# Patient Record
Sex: MALE | Race: WHITE
[De-identification: names, ages, dates, MRNs, and addresses within clinical notes are randomized per-mention and may not be internally consistent; named-entity substitution may affect disease eponyms.]

---

## 2017-12-15 ENCOUNTER — HOSPITAL ENCOUNTER (OUTPATIENT)
Dept: HOSPITAL 53 - M LAB | Age: 27
End: 2017-12-15
Attending: UROLOGY
Payer: COMMERCIAL

## 2017-12-15 DIAGNOSIS — Z01.818: Primary | ICD-10-CM

## 2017-12-15 DIAGNOSIS — N50.9: ICD-10-CM

## 2017-12-15 LAB
ANION GAP SERPL CALC-SCNC: 6 MEQ/L (ref 8–16)
BUN SERPL-MCNC: 16 MG/DL (ref 7–18)
CALCIUM SERPL-MCNC: 9.3 MG/DL (ref 8.5–10.1)
CHLORIDE SERPL-SCNC: 103 MEQ/L (ref 98–107)
CO2 SERPL-SCNC: 30 MEQ/L (ref 21–32)
CREAT SERPL-MCNC: 0.78 MG/DL (ref 0.7–1.3)
ERYTHROCYTE [DISTWIDTH] IN BLOOD BY AUTOMATED COUNT: 13 % (ref 11.5–14.5)
GFR SERPL CREATININE-BSD FRML MDRD: > 60 ML/MIN/{1.73_M2} (ref 60–?)
GLUCOSE SERPL-MCNC: 91 MG/DL (ref 70–105)
INR PPP: 0.91
MCH RBC QN AUTO: 30.3 PG (ref 27–33)
MCHC RBC AUTO-ENTMCNC: 32.8 G/DL (ref 32–36.5)
MCV RBC AUTO: 92.5 FL (ref 80–96)
NRBC BLD AUTO-RTO: 0 % (ref 0–0)
PLATELET # BLD AUTO: 286 10^3/UL (ref 150–450)
POTASSIUM SERPL-SCNC: 4.4 MEQ/L (ref 3.5–5.1)
SODIUM SERPL-SCNC: 139 MEQ/L (ref 136–145)
WBC # BLD AUTO: 10.6 10^3/UL (ref 4–10)

## 2017-12-18 ENCOUNTER — HOSPITAL ENCOUNTER (OUTPATIENT)
Dept: HOSPITAL 53 - M SDC | Age: 27
Discharge: HOME | End: 2017-12-18
Attending: UROLOGY
Payer: COMMERCIAL

## 2017-12-18 VITALS — WEIGHT: 315 LBS | HEIGHT: 70 IN | BODY MASS INDEX: 45.1 KG/M2

## 2017-12-18 VITALS — SYSTOLIC BLOOD PRESSURE: 132 MMHG | DIASTOLIC BLOOD PRESSURE: 76 MMHG

## 2017-12-18 DIAGNOSIS — C62.92: Primary | ICD-10-CM

## 2017-12-18 DIAGNOSIS — F31.9: ICD-10-CM

## 2017-12-18 DIAGNOSIS — Z79.899: ICD-10-CM

## 2017-12-18 DIAGNOSIS — Z88.8: ICD-10-CM

## 2017-12-18 DIAGNOSIS — F17.210: ICD-10-CM

## 2017-12-18 DIAGNOSIS — F41.9: ICD-10-CM

## 2017-12-18 RX ADMIN — HYDROMORPHONE HYDROCHLORIDE PRN MG: 1 INJECTION, SOLUTION INTRAMUSCULAR; INTRAVENOUS; SUBCUTANEOUS at 18:16

## 2017-12-18 RX ADMIN — HYDROMORPHONE HYDROCHLORIDE PRN MG: 1 INJECTION, SOLUTION INTRAMUSCULAR; INTRAVENOUS; SUBCUTANEOUS at 18:06

## 2017-12-18 RX ADMIN — HYDROMORPHONE HYDROCHLORIDE PRN MG: 1 INJECTION, SOLUTION INTRAMUSCULAR; INTRAVENOUS; SUBCUTANEOUS at 18:11

## 2017-12-18 RX ADMIN — HYDROMORPHONE HYDROCHLORIDE PRN MG: 1 INJECTION, SOLUTION INTRAMUSCULAR; INTRAVENOUS; SUBCUTANEOUS at 18:21

## 2017-12-18 RX ADMIN — HYDROMORPHONE HYDROCHLORIDE PRN MG: 1 INJECTION, SOLUTION INTRAMUSCULAR; INTRAVENOUS; SUBCUTANEOUS at 18:26

## 2017-12-18 RX ADMIN — FENTANYL CITRATE PRN MCG: 50 INJECTION, SOLUTION INTRAMUSCULAR; INTRAVENOUS at 17:45

## 2017-12-18 RX ADMIN — FENTANYL CITRATE PRN MCG: 50 INJECTION, SOLUTION INTRAMUSCULAR; INTRAVENOUS at 17:30

## 2017-12-18 RX ADMIN — FENTANYL CITRATE PRN MCG: 50 INJECTION, SOLUTION INTRAMUSCULAR; INTRAVENOUS at 17:40

## 2017-12-18 RX ADMIN — FENTANYL CITRATE PRN MCG: 50 INJECTION, SOLUTION INTRAMUSCULAR; INTRAVENOUS at 17:35

## 2017-12-18 NOTE — ROOPDOC
[facility] Report Of Operation


Report of Operation


DATE OF PROCEDURE:  12/18/17


 


PREPROCEDURE DIAGNOSIS:


Left testicular mass.


 


POSTPROCEDURE DIAGNOSIS:


Left testicular mass.


 


PROCEDURE:  Left radical orchiectomy.


 


SURGEON:  Dr. Daniel Dunn


 


ASSISTANT:  None.


 


ANESTHESIA:  General.


 


OPERATIVE INDICATIONS: This is a 27-year-old male who was recently found to 

have an enlarged left testicle with multiple masses. Given the likelihood that 

he has testicular cancer, it was recommended that he be brought to the 

operating room today for the above-listed procedure.


 


DESCRIPTION OF PROCEDURE:  The patient was brought to the operating room, and 

general anesthesia was induced. Prophylactic antibiotics were infused. He was 

then placed in supine position and prepped and draped in the usual sterile 

fashion. At this point, a 7 cm incision was made overlying the left external 

ring. We then dissected down through the subcutaneous tissues using Bovie 

electrocautery. Once we got down to the external oblique aponeurosis, this was 

opened using Metzenbaum scissors. We made sure not to damage the ilioinguinal 

nerve. At this point, a right-angle clamp was passed posterior to the spermatic 

cord, and a Penrose drain was wrapped around it twice. This was then clamped. 

We then delivered the testicle out of the scrotum and through our inguinal 

incision. Then gubernacular fibers were release using electrocautery. We 

continued to release fibers until the testicle was free from the scrotum. The 

spermatic cord was then traced up to the area where the Penrose drain had been 

placed. We tried to make sure we had good hemostasis while doing this. The 

spermatic cord was then freed up to the level of the internal ring. At this 

point, a right-angle clamp was placed across the spermatic cord. The cord was 

then suture ligated with an #0 silk suture. This was cut long. The cord was 

then divided into two separate packets distal to our #0 silk suture ligature. 

These packets were then separately ligated with #0 silk sutures. The cord was 

then transected distal to the #0 silk sutures. The left testicle and spermatic 

cord were then handed off the table to sent for pathologic analysis. At this 

point, the wound was irrigated. We closed the external oblique fascia using a 

running #2-0 Vicryl suture. Next, subcutaneous tissues were reapproximated with 

interrupted #2-0 chromic sutures. Skin was then closed with subcuticular #4-0 

Monocryl suture. Once the skin was closed, local anesthesia was administered. 

Dermabond was then applied to the incision, and this marked the conclusion of 

the procedure. The patient was then awakened from anesthesia and transported to 

the recovery room in stable condition.


 


ESTIMATED BLOOD LOSS: 5 mL.


 


COMPLICATIONS: None.


 


SPECIMENS: Left testicle and spermatic cord.


 


PLAN: The patient will be brought back to be seen in clinic in one to two weeks 

to go over pathology results and to discuss future treatment.











DANIEL DUNN MD Dec 18, 2017 17:39

## 2017-12-19 LAB — HCG INTACT+B SERPL-ACNC: 23 MIU/ML (ref 0–3)

## 2018-01-12 ENCOUNTER — HOSPITAL ENCOUNTER (OUTPATIENT)
Dept: HOSPITAL 53 - M LAB | Age: 28
End: 2018-01-12
Attending: UROLOGY
Payer: COMMERCIAL

## 2018-01-12 DIAGNOSIS — C62.90: Primary | ICD-10-CM

## 2018-01-12 LAB
ALPHA FETOPROTEIN TUMOR QUANT: 1.4 NG/ML (ref ?–8.1)
LDH SERPL L TO P-CCNC: 200 U/L (ref 87–241)

## 2018-01-14 LAB — HCG INTACT+B SERPL-ACNC: < 1 MIU/ML (ref 0–3)

## 2018-02-15 ENCOUNTER — HOSPITAL ENCOUNTER (OUTPATIENT)
Dept: HOSPITAL 53 - M ONCR | Age: 28
End: 2018-02-15
Attending: RADIOLOGY
Payer: COMMERCIAL

## 2018-02-15 DIAGNOSIS — C62.92: Primary | ICD-10-CM

## 2018-02-15 PROCEDURE — 99201: CPT

## 2021-05-01 ENCOUNTER — HOSPITAL ENCOUNTER (EMERGENCY)
Dept: HOSPITAL 53 - M ED | Age: 31
Discharge: HOME | End: 2021-05-01
Payer: SELF-PAY

## 2021-05-01 VITALS — HEIGHT: 71 IN | WEIGHT: 315 LBS | BODY MASS INDEX: 44.1 KG/M2

## 2021-05-01 VITALS — DIASTOLIC BLOOD PRESSURE: 67 MMHG | SYSTOLIC BLOOD PRESSURE: 118 MMHG

## 2021-05-01 DIAGNOSIS — C62.92: ICD-10-CM

## 2021-05-01 DIAGNOSIS — Z79.899: ICD-10-CM

## 2021-05-01 DIAGNOSIS — K62.5: Primary | ICD-10-CM

## 2021-05-01 DIAGNOSIS — R11.2: ICD-10-CM

## 2021-05-01 DIAGNOSIS — M54.5: ICD-10-CM

## 2021-05-01 DIAGNOSIS — Z88.8: ICD-10-CM

## 2021-05-01 DIAGNOSIS — K76.0: ICD-10-CM

## 2021-05-01 DIAGNOSIS — F90.9: ICD-10-CM

## 2021-05-01 DIAGNOSIS — F17.200: ICD-10-CM

## 2021-05-01 DIAGNOSIS — D35.00: ICD-10-CM

## 2021-05-01 DIAGNOSIS — R10.9: ICD-10-CM

## 2021-05-01 LAB
ALBUMIN SERPL BCG-MCNC: 3.9 GM/DL (ref 3.2–5.2)
ALT SERPL W P-5'-P-CCNC: 46 U/L (ref 12–78)
BASOPHILS # BLD AUTO: 0 10^3/UL (ref 0–0.2)
BASOPHILS NFR BLD AUTO: 0.4 % (ref 0–1)
BILIRUB CONJ SERPL-MCNC: 0.1 MG/DL (ref 0–0.2)
BILIRUB SERPL-MCNC: 0.3 MG/DL (ref 0.2–1)
BUN SERPL-MCNC: 14 MG/DL (ref 7–18)
CALCIUM SERPL-MCNC: 9.7 MG/DL (ref 8.5–10.1)
CHLORIDE SERPL-SCNC: 105 MEQ/L (ref 98–107)
CO2 SERPL-SCNC: 27 MEQ/L (ref 21–32)
CREAT SERPL-MCNC: 0.74 MG/DL (ref 0.7–1.3)
CRP SERPL-MCNC: 0.66 MG/DL (ref 0–0.3)
EOSINOPHIL # BLD AUTO: 0.1 10^3/UL (ref 0–0.5)
EOSINOPHIL NFR BLD AUTO: 0.7 % (ref 0–3)
ERYTHROCYTE [SEDIMENTATION RATE] IN BLOOD BY WESTERGREN METHOD: 14 MM/HR (ref 0–15)
GFR SERPL CREATININE-BSD FRML MDRD: > 60 ML/MIN/{1.73_M2} (ref 60–?)
GLUCOSE SERPL-MCNC: 122 MG/DL (ref 70–100)
HCT VFR BLD AUTO: 44.2 % (ref 42–52)
HGB BLD-MCNC: 14.4 G/DL (ref 13.5–17.5)
LIPASE SERPL-CCNC: 46 U/L (ref 73–393)
LYMPHOCYTES # BLD AUTO: 1.7 10^3/UL (ref 1.5–5)
LYMPHOCYTES NFR BLD AUTO: 17 % (ref 24–44)
MCH RBC QN AUTO: 29.8 PG (ref 27–33)
MCHC RBC AUTO-ENTMCNC: 32.6 G/DL (ref 32–36.5)
MCV RBC AUTO: 91.3 FL (ref 80–96)
MONOCYTES # BLD AUTO: 0.9 10^3/UL (ref 0–0.8)
MONOCYTES NFR BLD AUTO: 8.9 % (ref 2–8)
NEUTROPHILS # BLD AUTO: 7.3 10^3/UL (ref 1.5–8.5)
NEUTROPHILS NFR BLD AUTO: 72.3 % (ref 36–66)
PLATELET # BLD AUTO: 319 10^3/UL (ref 150–450)
POTASSIUM SERPL-SCNC: 4.2 MEQ/L (ref 3.5–5.1)
PROT SERPL-MCNC: 6.9 GM/DL (ref 6.4–8.2)
RBC # BLD AUTO: 4.84 10^6/UL (ref 4.3–6.1)
RSV RNA NPH QL NAA+PROBE: NEGATIVE
SODIUM SERPL-SCNC: 139 MEQ/L (ref 136–145)
WBC # BLD AUTO: 10.1 10^3/UL (ref 4–10)

## 2021-05-01 PROCEDURE — 86901 BLOOD TYPING SEROLOGIC RH(D): CPT

## 2021-05-01 PROCEDURE — 85652 RBC SED RATE AUTOMATED: CPT

## 2021-05-01 PROCEDURE — 86850 RBC ANTIBODY SCREEN: CPT

## 2021-05-01 PROCEDURE — 74177 CT ABD & PELVIS W/CONTRAST: CPT

## 2021-05-01 PROCEDURE — 85025 COMPLETE CBC W/AUTO DIFF WBC: CPT

## 2021-05-01 PROCEDURE — 81001 URINALYSIS AUTO W/SCOPE: CPT

## 2021-05-01 PROCEDURE — 80048 BASIC METABOLIC PNL TOTAL CA: CPT

## 2021-05-01 PROCEDURE — 96361 HYDRATE IV INFUSION ADD-ON: CPT

## 2021-05-01 PROCEDURE — 86140 C-REACTIVE PROTEIN: CPT

## 2021-05-01 PROCEDURE — 86900 BLOOD TYPING SEROLOGIC ABO: CPT

## 2021-05-01 PROCEDURE — 83690 ASSAY OF LIPASE: CPT

## 2021-05-01 PROCEDURE — 87631 RESP VIRUS 3-5 TARGETS: CPT

## 2021-05-01 PROCEDURE — 96374 THER/PROPH/DIAG INJ IV PUSH: CPT

## 2021-05-01 PROCEDURE — 99284 EMERGENCY DEPT VISIT MOD MDM: CPT

## 2021-05-01 PROCEDURE — 80076 HEPATIC FUNCTION PANEL: CPT

## 2021-05-01 RX ADMIN — DIATRIZOATE MEGLUMINE AND DIATRIZOATE SODIUM SCH ML: 600; 100 SOLUTION ORAL; RECTAL at 13:35

## 2021-05-01 RX ADMIN — DIATRIZOATE MEGLUMINE AND DIATRIZOATE SODIUM SCH ML: 600; 100 SOLUTION ORAL; RECTAL at 14:13

## 2021-05-01 NOTE — REP
INDICATION:

bloody diarrhea, lower abd pain, hx of testicular cancer.



COMPARISON:

01/12/2018



TECHNIQUE:

Axial contrast-enhanced images from the lung bases to the pubic symphysis using 100 cc

Isovue 370 intravenous contrast material.  Coronal and sagittal reformations obtained.



This CT examination was performed using the following dose reduction techniques:

Automated exposure control, adjustment of mA and/or kv according to the patient's

size, and the use of iterative reconstruction technique.



FINDINGS:

Lung bases are clear.  Chronic elevation to the left hemidiaphragm is unchanged.



Liver demonstrates diffuse fatty infiltration without focal hepatic lesion.  The

spleen, pancreas, gallbladder, left adrenal gland and bilateral kidneys are normal.

Right adrenal gland demonstrates stable adenoma.



The enteric system including stomach, small, and large bowel appears normal.  No

evidence for obstruction or acute inflammatory process.  Normal terminal ileum and

cecum are identified in the right lower quadrant.



Pelvis demonstrates normal bladder and age-appropriate prostate/seminal vesicles.



No ascites.  No free air.  No intraperitoneal or retroperitoneal adenopathy.

Abdominal aorta and vasculature appear normal.  Musculoskeletal structures are intact

and without acute osseous abnormality.



IMPRESSION:

No acute abdominopelvic pathology appreciated.

Hepatosteatosis.

Stable benign right adrenal adenoma.





<Electronically signed by John Armas > 05/01/21 1526

## 2021-05-06 ENCOUNTER — HOSPITAL ENCOUNTER (INPATIENT)
Dept: HOSPITAL 53 - M ED | Age: 31
LOS: 4 days | Discharge: HOME | DRG: 251 | End: 2021-05-10
Attending: INTERNAL MEDICINE | Admitting: INTERNAL MEDICINE
Payer: SELF-PAY

## 2021-05-06 VITALS — WEIGHT: 315 LBS | BODY MASS INDEX: 41.75 KG/M2 | HEIGHT: 73 IN

## 2021-05-06 VITALS — SYSTOLIC BLOOD PRESSURE: 105 MMHG | DIASTOLIC BLOOD PRESSURE: 56 MMHG

## 2021-05-06 VITALS — DIASTOLIC BLOOD PRESSURE: 79 MMHG | SYSTOLIC BLOOD PRESSURE: 112 MMHG

## 2021-05-06 VITALS — DIASTOLIC BLOOD PRESSURE: 75 MMHG | SYSTOLIC BLOOD PRESSURE: 130 MMHG

## 2021-05-06 DIAGNOSIS — Z85.47: ICD-10-CM

## 2021-05-06 DIAGNOSIS — Z86.73: ICD-10-CM

## 2021-05-06 DIAGNOSIS — K63.5: ICD-10-CM

## 2021-05-06 DIAGNOSIS — R10.9: Primary | ICD-10-CM

## 2021-05-06 DIAGNOSIS — R11.2: ICD-10-CM

## 2021-05-06 DIAGNOSIS — K64.8: ICD-10-CM

## 2021-05-06 DIAGNOSIS — F17.200: ICD-10-CM

## 2021-05-06 DIAGNOSIS — Z88.8: ICD-10-CM

## 2021-05-06 DIAGNOSIS — M54.9: ICD-10-CM

## 2021-05-06 DIAGNOSIS — K62.5: ICD-10-CM

## 2021-05-06 DIAGNOSIS — Z92.3: ICD-10-CM

## 2021-05-06 DIAGNOSIS — F41.9: ICD-10-CM

## 2021-05-06 DIAGNOSIS — D72.829: ICD-10-CM

## 2021-05-06 DIAGNOSIS — E27.8: ICD-10-CM

## 2021-05-06 DIAGNOSIS — K76.0: ICD-10-CM

## 2021-05-06 LAB
ALBUMIN SERPL BCG-MCNC: 4.1 GM/DL (ref 3.2–5.2)
ALT SERPL W P-5'-P-CCNC: 44 U/L (ref 12–78)
AMPHETAMINES UR QL SCN: NEGATIVE
AMYLASE SERPL-CCNC: 185 U/L (ref 25–115)
BARBITURATES UR QL SCN: NEGATIVE
BASOPHILS # BLD AUTO: 0 10^3/UL (ref 0–0.2)
BASOPHILS NFR BLD AUTO: 0.3 % (ref 0–1)
BENZODIAZ UR QL SCN: NEGATIVE
BILIRUB CONJ SERPL-MCNC: 0.1 MG/DL (ref 0–0.2)
BILIRUB SERPL-MCNC: 0.3 MG/DL (ref 0.2–1)
BUN SERPL-MCNC: 24 MG/DL (ref 7–18)
BZE UR QL SCN: NEGATIVE
CALCIUM SERPL-MCNC: 8.9 MG/DL (ref 8.5–10.1)
CANNABINOIDS UR QL SCN: POSITIVE
CHLORIDE SERPL-SCNC: 108 MEQ/L (ref 98–107)
CK MB CFR.DF SERPL CALC: 1.89
CK MB CFR.DF SERPL CALC: 2.17
CK MB CFR.DF SERPL CALC: 2.63
CK MB SERPL-MCNC: < 1 NG/ML (ref ?–3.6)
CK SERPL-CCNC: 38 U/L (ref 39–308)
CK SERPL-CCNC: 46 U/L (ref 39–308)
CK SERPL-CCNC: 53 U/L (ref 39–308)
CO2 SERPL-SCNC: 28 MEQ/L (ref 21–32)
CREAT SERPL-MCNC: 0.85 MG/DL (ref 0.7–1.3)
EOSINOPHIL # BLD AUTO: 0.1 10^3/UL (ref 0–0.5)
EOSINOPHIL NFR BLD AUTO: 0.8 % (ref 0–3)
ETHANOL SERPL-MCNC: < 0.003 % (ref 0–0.01)
GFR SERPL CREATININE-BSD FRML MDRD: > 60 ML/MIN/{1.73_M2} (ref 60–?)
GLUCOSE SERPL-MCNC: 99 MG/DL (ref 70–100)
HCT VFR BLD AUTO: 39.2 % (ref 42–52)
HCT VFR BLD AUTO: 41.2 % (ref 42–52)
HCT VFR BLD AUTO: 45.7 % (ref 42–52)
HGB BLD-MCNC: 12.5 G/DL (ref 13.5–17.5)
HGB BLD-MCNC: 12.9 G/DL (ref 13.5–17.5)
HGB BLD-MCNC: 14.6 G/DL (ref 13.5–17.5)
LIPASE SERPL-CCNC: 1244 U/L (ref 73–393)
LYMPHOCYTES # BLD AUTO: 1.4 10^3/UL (ref 1.5–5)
LYMPHOCYTES NFR BLD AUTO: 12.4 % (ref 24–44)
MCH RBC QN AUTO: 29.7 PG (ref 27–33)
MCHC RBC AUTO-ENTMCNC: 31.9 G/DL (ref 32–36.5)
MCV RBC AUTO: 93.1 FL (ref 80–96)
METHADONE UR QL SCN: NEGATIVE
MONOCYTES # BLD AUTO: 1.2 10^3/UL (ref 0–0.8)
MONOCYTES NFR BLD AUTO: 10.7 % (ref 2–8)
NEUTROPHILS # BLD AUTO: 8.8 10^3/UL (ref 1.5–8.5)
NEUTROPHILS NFR BLD AUTO: 75.1 % (ref 36–66)
OPIATES UR QL SCN: POSITIVE
PCP UR QL SCN: NEGATIVE
PLATELET # BLD AUTO: 341 10^3/UL (ref 150–450)
POTASSIUM SERPL-SCNC: 4.2 MEQ/L (ref 3.5–5.1)
PROT SERPL-MCNC: 7.5 GM/DL (ref 6.4–8.2)
RBC # BLD AUTO: 4.91 10^6/UL (ref 4.3–6.1)
SODIUM SERPL-SCNC: 141 MEQ/L (ref 136–145)
TRIGL SERPL-MCNC: 110 MG/DL (ref ?–150)
TROPONIN I SERPL-MCNC: < 0.02 NG/ML (ref ?–0.1)
WBC # BLD AUTO: 11.6 10^3/UL (ref 4–10)

## 2021-05-06 RX ADMIN — SODIUM CHLORIDE SCH MLS/HR: 9 INJECTION, SOLUTION INTRAVENOUS at 16:20

## 2021-05-06 RX ADMIN — MORPHINE SULFATE PRN MG: 4 INJECTION INTRAVENOUS at 13:37

## 2021-05-06 RX ADMIN — ONDANSETRON SCH MG: 2 INJECTION INTRAMUSCULAR; INTRAVENOUS at 16:47

## 2021-05-06 RX ADMIN — MORPHINE SULFATE PRN MG: 4 INJECTION INTRAVENOUS at 20:16

## 2021-05-06 RX ADMIN — DOCUSATE SODIUM SCH MG: 100 CAPSULE, LIQUID FILLED ORAL at 09:00

## 2021-05-06 RX ADMIN — DEXTROSE MONOHYDRATE SCH MG: 50 INJECTION, SOLUTION INTRAVENOUS at 11:52

## 2021-05-06 RX ADMIN — SUCRALFATE SCH GM: 1 TABLET ORAL at 16:47

## 2021-05-06 RX ADMIN — DOCUSATE SODIUM SCH MG: 100 CAPSULE, LIQUID FILLED ORAL at 20:15

## 2021-05-06 RX ADMIN — SODIUM CHLORIDE SCH MLS/HR: 9 INJECTION, SOLUTION INTRAVENOUS at 11:52

## 2021-05-06 RX ADMIN — ONDANSETRON SCH MG: 2 INJECTION INTRAMUSCULAR; INTRAVENOUS at 13:36

## 2021-05-06 RX ADMIN — SODIUM CHLORIDE SCH MLS/HR: 9 INJECTION, SOLUTION INTRAVENOUS at 19:15

## 2021-05-06 RX ADMIN — ONDANSETRON SCH MG: 2 INJECTION INTRAMUSCULAR; INTRAVENOUS at 20:15

## 2021-05-06 RX ADMIN — SUCRALFATE SCH GM: 1 TABLET ORAL at 20:15

## 2021-05-06 RX ADMIN — SUCRALFATE SCH GM: 1 TABLET ORAL at 11:52

## 2021-05-06 NOTE — REP
INDICATION:

diffuse abd pain, thoracic/lumbar back pain



COMPARISON:

None.



TECHNIQUE:

Axial contrast enhanced images from the thoracic inlet to the upper abdomen using

aortic arterial angiographic technique with multiplanar re-formations.  100 ml Isovue

370 intravenous contrast material administered without complication followed by CT of

the abdomen and pelvis.



This CT examination was performed using the following dose reduction techniques:

Automated exposure control, adjustment of mA and/or kv according to the patient's

size, and use of iterative reconstruction technique.





FINDINGS:

Thoracic aorta is normal in appearance and size without aneurysm or dissection.

Pulmonary arteries are unremarkable and without embolus.  Heart and pericardium are

normal.  Bilateral lung fields are clear and without consolidation, effusion, or

pneumothorax.  Tracheobronchial tree is patent.  No obvious adenopathy.



IMPRESSION:

Normal thoracic aorta without aneurysm or dissection.

No evidence for pulmonary embolus.

No acute mediastinal or pleural parenchymal process.





<Electronically signed by John Armas > 05/06/21 0959

## 2021-05-06 NOTE — REP
INDICATION:

Pancreatitis.



COMPARISON:

Comparison is made with today's CT study of the abdomen and pelvis..



TECHNIQUE:

Right upper quadrant sonography.  Exam quality is inhibited by patient body habitus.



FINDINGS:

Scanning through the right upper quadrant of the abdomen demonstrates a normal sized,

thin-walled gallbladder without evidence of stone or polyp.  Common bile duct is

normal measuring 0.66 cm in greatest diameter.  No focal liver lesion is seen.  There

is poor insonation of the liver generally consistent with fatty infiltration.  This

corresponds with the CT.  Liver size is normal.  The pancreas is partially obscured by

gas.  No pancreatic abnormality is observed.  No right renal abnormality is seen.

There is no evidence of ascites.  The right kidney measures 14.2 x 6.4 x 6.8 cm.  A

2.3 x 3.2 x 3.1 cm slightly hypoechoic right suprarenal nodule is seen corresponding

with the right adrenal nodule observed on today's CT study.



IMPRESSION:

Small right adrenal nodule noted as on CT.  Evidence of fatty infiltration of the

liver..





<Electronically signed by Curtis Munson > 05/06/21 [id number]

## 2021-05-06 NOTE — HPEPDOC
[facility] Medical History & Physical


Date of Admission


May 6, 2021


Date of Service:  May 6, 2021





History and Physical


Chief complaint:


Who presented to the emergency room with complaints of abdominal pain





History of present illness:


Patient is a 30-year-old  male who presented to the emergency room with

complaints of persistent back and abdominal pain.





Patient reported that last Wednesday he was reporting back pain and went to the 

[facility] patient received a CT scan that was essentially negative.

Was given a dose of morphine and subsequently sent home. Patient began 

experiencing abdominal pain and presented back to [facility] on Thursday and 

patient was discharged home without any additional workup.





On Saturday. Patient reported that he continue to have abdominal pain and came 

to [facility] where he had a CT scan of his abdomen that had 

revealed an adrenal adenoma that was consistent with findings 2018. Lab work was

unrevealing and patient was sent home with outpatient follow-up with primary 

care provider.





Patient is presented to the emergency room today, which is his essentially 

fourth visit in the duration of the week with complaints of persistent abdominal

and lower back pain associated with nausea and vomiting. Patient has reported 

that nausea and vomiting. Has started on Saturday and has progressed. Hes 

reported 8 episodes of vomiting per day without any evidence of blood, described

as clear. 





Patient has reported that he is experiencing lower abdominal pain described as 

sharp, stabbing nature, described as a 7/10 with mild alleviation with morphine 

received in the emergency room, patient also reports radiation to his back, 

described as throbbing-like pain 8/10. Patient denies any urinary discomfort. 

Has not expressed any recent fevers or chills.





Since Saturday, patient has reported a few episodes of blood in his stool, but 

also reports that this has resolved since Sunday.. She reports that his last b

owel movement was this morning, described as dark but formed.





Patient denies any significant shortness of breath or change in his baseline 

cough but does describe stabbing chest pain when he vomits. This has essentially

resolved at this time.





Patient reports that his last meal was yesterday.





Past Medical History:


Reported CVA (2017) currently on ASA 81


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 

of radiation


Anxiety





Past Surgical History:


Appendectomy


Cleft lip and palate repair


Bilateral tympanostomy


Left orchiectomy 12/18/2017





Allergies:


See below 





Medications:


See below





Family History:


- Family history of stomach cancer and breast cancer





Social History: 


- Patient reports that he is an active smoker. Reports rare alcohol use, last 

use was 4 weeks ago. Occasionally uses marijuana


- Denies recent travel or sick contacts


- Lives with roommate


- Occupation; patient reports that he works as a 





Review of Systems:


10 point review of systems complete, all negative otherwise stated in HPI





Physical exam:


- Vitals: BP [130/75], HR [71], RR [18], Sat [99%RA], Temp [96.5F]


- General: Lying in bed, Reporting abdominal pain, Speaking in full sentences, 

AAOx3


- HEENT: NC, AT, PERRLA


- CVS: RRR, +S1S2


- Lungs: Fair air entry bilaterally, No appreciable wheezing / rales / rhonchi 


- Abdomen: Soft, Non-distended, Tenderness diffusely, no guarding or rigidity, 

morbid obesity


- Extremities: No lower extremity edema, No calf tenderness


- Neuro: No focal motor or sensory deficit


- Skin: No visible rashes 





Labs:


See below 





Imaging:


CT abdomen / pelvis 5/6:


No acute abdominopelvic pathology appreciated. Benign right adrenal adenoma 

remains stable compared with 2018.





CTA chest 5/6:


Normal thoracic aorta without aneurysm or dissection. No evidence for pulmonary 

embolus.





CXR 5/6:


Elevated left hemidiaphragm. Cephalization. No infiltrate seen.





Liver US 5/6:


Small right adrenal nodule noted as on CT.  Evidence of fatty infiltration of 

the liver.





EKG: 


See below





Assessment and Plan: 


Abdominal / back pain - possibly 2/2 pancreatitis, possibly 2/2 colitis 


- Patient is reporting progressive back/abdominal pain that has been progressing

 over the duration of 1 week


- Reported associated nausea and vomiting


- Lipase was noted to be elevated >3x upper limit of normal


- Imaging without any significant abnormalities


- Will check lipase / triglyceride/ ethanol level / urine drug screen


- Will check liver US


- Will keep patient NPO


- Will start fluids and symptomatic control with Morphine and Zofran 





Reported dark stools


- Will check stool for occult blood 


- Will trend H&H


- Will start Protonix / Carafate 





Leukocytosis 


- Review of systems is negative for any source of infection


- Will check blood cultures and pro-calcitonin


- Will hold off on antibiotics at this time 





Reported CVA (2017) 


- Currently not on ASA 





Testicular cancer 


- Seminoma Diagnosed in 2017


- s/p Left orchiectomy 2017 with 2 sessions of radiation





Anxiety


- Currently not on any medications





Gastrointestinal prophylaxis


- Will start Protonix / Carafate 


- See above





DVT prophylaxis 


- Will start TEDs/Sequentials





Vital Signs





Vital Signs








  Date Time  Temp Pulse Resp B/P (MAP) Pulse Ox O2 Delivery O2 Flow Rate FiO2


 


5/6/21 13:37   18   Room Air  


 


5/6/21 13:36 96.5 71  130/75 (93) 99   











Laboratory Data


Labs 24H


Laboratory Tests 2


5/6/21 08:15: 


Immature Granulocyte % (Auto) 0.7, Neutrophils (%) (Auto) 75.1H, Lymphocytes (%)

 (Auto) 12.4L, Monocytes (%) (Auto) 10.7H, Eosinophils (%) (Auto) 0.8, Basophils

 (%) (Auto) 0.3, Neutrophils # (Auto) 8.8H, Lymphocytes # (Auto) 1.4L, Monocytes

 # (Auto) 1.2H, Eosinophils # (Auto) 0.1, Basophils # (Auto) 0.0, Nucleated Red 

Blood Cells % (auto) 0.0, Anion Gap 5L, Glomerular Filtration Rate > 60.0, 

Calcium Level 8.9, Total Bilirubin 0.3, Direct Bilirubin 0.1, Aspartate Amino 

Transf (AST/SGOT) 13, Alanine Aminotransferase (ALT/SGPT) 44, Alkaline 

Phosphatase 120H, Total Creatine Kinase 53, Creatine Kinase MB < 1.0, Creatine 

Kinase MB Relative Index 1.89, Troponin I < 0.02, Total Protein 7.5, Albumin 

4.1, Albumin/Globulin Ratio 1.2, Triglycerides Level 110, Amylase Level 185H, 

Lipase 1244H, Ethyl Alcohol Level < 0.003


5/6/21 09:31: 


Urine Color YELLOW, Urine Appearance CLEAR, Urine pH 6.0, Urine Specific Gravity

 1.028, Urine Protein NEGATIVE, Urine Glucose (UA) NEGATIVE, Urine Ketones 

NEGATIVE, Urine Blood NEGATIVE, Urine Nitrite NEGATIVE, Urine Bilirubin 

NEGATIVE, Urine Urobilinogen 0.2, Urine Leukocyte Esterase NEGATIVE, Urine WBC 

(Auto) 1, Urine RBC (Auto) 0, Urine Hyaline Casts (Auto) 0, Urine Bacteria 

(Auto) NEGATIVE, Urine Squamous Epithelial Cells 0, Urine Mucus (Auto) SMALL, 

Urine Sperm (Auto) 


5/6/21 12:04: 


Total Creatine Kinase 46, Creatine Kinase MB < 1.0, Creatine Kinase MB Relative 

Index 2.17, Troponin I < 0.02, Lactic Acid Level 1.5


CBC/BMP


Laboratory Tests


5/6/21 08:15








5/6/21 12:04








Microbiology





Microbiology


5/6/21 Blood Culture, Received


         Pending


5/6/21 Respiratory Virus Panel (PCR) (FERNANDO) - Final, Complete





Home Medications


Scheduled PRN


Ondansetron (Ondansetron Odt) 4 Mg Tab.rapdis, 4 MG PO Q6H PRN for NAUSEA OR 

VOMITING





Allergies


Coded Allergies:  


     isopropyl alcohol (Verified  Allergy, Unknown, 5/1/21)











CHEPE RINCON MD                 May 6, 2021 14:29

## 2021-05-06 NOTE — REP
INDICATION:

diffuse abd pain, thoracic/lumbar back pain.



COMPARISON:

01/12/2018



TECHNIQUE:

Axial contrast-enhanced images from the lung bases to the pubic symphysis using 100 cc

Isovue 370 intravenous contrast material.  Coronal and sagittal reformations obtained.



This CT examination was performed using the following dose reduction techniques:

Automated exposure control, adjustment of mA and/or kv according to the patient's

size, and the use of iterative reconstruction technique.



FINDINGS:

Liver demonstrates mild fatty infiltration without focal hepatic lesion.



Spleen, pancreas, gallbladder, left adrenal gland and bilateral kidneys are normal.  2

cm right adrenal lesion consistent with adenoma and unchanged compared to 2018.



The enteric system including stomach, small, and large bowel appears normal.  No

evidence for obstruction or acute inflammatory process.  Normal terminal ileum and

appendix are identified in the right lower quadrant.



Pelvis demonstrates normal bladder and age-appropriate prostate/seminal vesicles.



No ascites.  No free air.  No intraperitoneal or retroperitoneal adenopathy.

Abdominal aorta and vasculature appear normal.  Musculoskeletal structures are intact

and without acute osseous abnormality.



IMPRESSION:

No acute abdominopelvic pathology appreciated.

Benign right adrenal adenoma remains stable compared with 2018.





<Electronically signed by John Armas > 05/06/21 0951

## 2021-05-06 NOTE — ECGEPIP
[facility] - ED

                                       

                                       Test Date:    2021

Pat Name:     CANDY PALOMINO         Department:   

Patient ID:   [id number]                 Room:         [place] [address]

Gender:       Male                     Technician:   NOAH

:          1990               Requested By: TREVOR CLARKE PA-C

Order Number: [id number]     Reading MD:   Solis Soto

                                 Measurements

Intervals                              Axis          

Rate:         75                       P:            47

AZ:           138                      QRS:          68

QRSD:         110                      T:            49

QT:           376                                    

QTc:          419                                    

                           Interpretive Statements

Sinus rhythm with marked sinus arrhythmia

Comparison tracing not on file

Electronically Signed on 2021 17:09:32 EDT by Solis Soto

## 2021-05-06 NOTE — REP
INDICATION:

chest pain.



COMPARISON:

Comparison radiograph 12 January 2018..



TECHNIQUE:

Portable AP sitting chest x-ray.



FINDINGS:

Left hemidiaphragm is monitor it Daly elevated.  This is unchanged.  Pleural angles are

sharp.  No infiltrate is seen.  Cardiomediastinal silhouette is unchanged.  Pulmonary

vasculature is cephalized.



IMPRESSION:

Elevated left hemidiaphragm.  Cephalization.  No infiltrate seen.





<Electronically signed by Curtis Munson > 05/06/21 1005

## 2021-05-07 VITALS — DIASTOLIC BLOOD PRESSURE: 84 MMHG | SYSTOLIC BLOOD PRESSURE: 136 MMHG

## 2021-05-07 VITALS — DIASTOLIC BLOOD PRESSURE: 87 MMHG | SYSTOLIC BLOOD PRESSURE: 155 MMHG

## 2021-05-07 VITALS — DIASTOLIC BLOOD PRESSURE: 91 MMHG | SYSTOLIC BLOOD PRESSURE: 137 MMHG

## 2021-05-07 VITALS — DIASTOLIC BLOOD PRESSURE: 69 MMHG | SYSTOLIC BLOOD PRESSURE: 116 MMHG

## 2021-05-07 VITALS — SYSTOLIC BLOOD PRESSURE: 129 MMHG | DIASTOLIC BLOOD PRESSURE: 61 MMHG

## 2021-05-07 VITALS — DIASTOLIC BLOOD PRESSURE: 81 MMHG | SYSTOLIC BLOOD PRESSURE: 136 MMHG

## 2021-05-07 LAB
BASOPHILS # BLD AUTO: 0 10^3/UL (ref 0–0.2)
BASOPHILS NFR BLD AUTO: 0.2 % (ref 0–1)
BUN SERPL-MCNC: 9 MG/DL (ref 7–18)
CALCIUM SERPL-MCNC: 8.9 MG/DL (ref 8.5–10.1)
CHLORIDE SERPL-SCNC: 109 MEQ/L (ref 98–107)
CO2 SERPL-SCNC: 27 MEQ/L (ref 21–32)
CREAT SERPL-MCNC: 0.63 MG/DL (ref 0.7–1.3)
EOSINOPHIL # BLD AUTO: 0.1 10^3/UL (ref 0–0.5)
EOSINOPHIL NFR BLD AUTO: 1.3 % (ref 0–3)
GFR SERPL CREATININE-BSD FRML MDRD: > 60 ML/MIN/{1.73_M2} (ref 60–?)
GLUCOSE SERPL-MCNC: 100 MG/DL (ref 70–100)
HCT VFR BLD AUTO: 37.8 % (ref 42–52)
HCT VFR BLD AUTO: 39 % (ref 42–52)
HGB BLD-MCNC: 12.3 G/DL (ref 13.5–17.5)
HGB BLD-MCNC: 12.4 G/DL (ref 13.5–17.5)
LYMPHOCYTES # BLD AUTO: 1.9 10^3/UL (ref 1.5–5)
LYMPHOCYTES NFR BLD AUTO: 21.3 % (ref 24–44)
MAGNESIUM SERPL-MCNC: 2 MG/DL (ref 1.8–2.4)
MCH RBC QN AUTO: 30 PG (ref 27–33)
MCHC RBC AUTO-ENTMCNC: 31.8 G/DL (ref 32–36.5)
MCV RBC AUTO: 94.2 FL (ref 80–96)
MONOCYTES # BLD AUTO: 0.9 10^3/UL (ref 0–0.8)
MONOCYTES NFR BLD AUTO: 10.5 % (ref 2–8)
NEUTROPHILS # BLD AUTO: 5.8 10^3/UL (ref 1.5–8.5)
NEUTROPHILS NFR BLD AUTO: 66 % (ref 36–66)
PLATELET # BLD AUTO: 274 10^3/UL (ref 150–450)
POTASSIUM SERPL-SCNC: 3.9 MEQ/L (ref 3.5–5.1)
RBC # BLD AUTO: 4.14 10^6/UL (ref 4.3–6.1)
SODIUM SERPL-SCNC: 141 MEQ/L (ref 136–145)
WBC # BLD AUTO: 8.8 10^3/UL (ref 4–10)

## 2021-05-07 RX ADMIN — ONDANSETRON SCH MG: 2 INJECTION INTRAMUSCULAR; INTRAVENOUS at 20:52

## 2021-05-07 RX ADMIN — ONDANSETRON SCH MG: 2 INJECTION INTRAMUSCULAR; INTRAVENOUS at 17:41

## 2021-05-07 RX ADMIN — MORPHINE SULFATE PRN MG: 4 INJECTION INTRAVENOUS at 04:41

## 2021-05-07 RX ADMIN — DOCUSATE SODIUM SCH MG: 100 CAPSULE, LIQUID FILLED ORAL at 20:52

## 2021-05-07 RX ADMIN — DOCUSATE SODIUM SCH MG: 100 CAPSULE, LIQUID FILLED ORAL at 20:53

## 2021-05-07 RX ADMIN — ONDANSETRON SCH MG: 2 INJECTION INTRAMUSCULAR; INTRAVENOUS at 12:58

## 2021-05-07 RX ADMIN — SODIUM CHLORIDE SCH MLS/HR: 9 INJECTION, SOLUTION INTRAVENOUS at 12:45

## 2021-05-07 RX ADMIN — SUCRALFATE SCH GM: 1 TABLET ORAL at 20:52

## 2021-05-07 RX ADMIN — SUCRALFATE SCH GM: 1 TABLET ORAL at 08:03

## 2021-05-07 RX ADMIN — DEXTROSE MONOHYDRATE SCH MG: 50 INJECTION, SOLUTION INTRAVENOUS at 01:16

## 2021-05-07 RX ADMIN — DEXTROSE MONOHYDRATE SCH MG: 50 INJECTION, SOLUTION INTRAVENOUS at 12:53

## 2021-05-07 RX ADMIN — SODIUM CHLORIDE SCH MLS/HR: 9 INJECTION, SOLUTION INTRAVENOUS at 00:40

## 2021-05-07 RX ADMIN — SODIUM CHLORIDE SCH MLS/HR: 9 INJECTION, SOLUTION INTRAVENOUS at 06:45

## 2021-05-07 RX ADMIN — SUCRALFATE SCH GM: 1 TABLET ORAL at 17:41

## 2021-05-07 RX ADMIN — ONDANSETRON SCH MG: 2 INJECTION INTRAMUSCULAR; INTRAVENOUS at 04:46

## 2021-05-07 RX ADMIN — ONDANSETRON SCH MG: 2 INJECTION INTRAMUSCULAR; INTRAVENOUS at 01:16

## 2021-05-07 RX ADMIN — ONDANSETRON SCH MG: 2 INJECTION INTRAMUSCULAR; INTRAVENOUS at 09:01

## 2021-05-07 RX ADMIN — MORPHINE SULFATE PRN MG: 4 INJECTION INTRAVENOUS at 09:01

## 2021-05-07 RX ADMIN — SUCRALFATE SCH GM: 1 TABLET ORAL at 12:53

## 2021-05-07 RX ADMIN — SODIUM CHLORIDE SCH MLS/HR: 9 INJECTION, SOLUTION INTRAVENOUS at 00:08

## 2021-05-07 RX ADMIN — MORPHINE SULFATE PRN MG: 2 INJECTION, SOLUTION INTRAMUSCULAR; INTRAVENOUS at 16:32

## 2021-05-07 RX ADMIN — DOCUSATE SODIUM SCH MG: 100 CAPSULE, LIQUID FILLED ORAL at 09:01

## 2021-05-07 RX ADMIN — SODIUM CHLORIDE SCH MLS/HR: 9 INJECTION, SOLUTION INTRAVENOUS at 08:03

## 2021-05-07 NOTE — IPNPDOC
Text Note


Date of Service


The patient was seen on 5/7/21.





NOTE


Subjective:


Patient is a 30-year-old  male who presented to the emergency room with

complaints of persistent back and abdominal pain. Patient has been experiencing 

back/abdominal pain since last week Wednesday and has progressed to nausea and 

vomiting. Upon arrival to emergency room, patient was found to have an elevated 

lipase and was admitted to the hospitalist service for suspected acute 

pancreatitis.





Patient was seen and examined at the bedside. Patient has reported improvement 

of his symptoms. He denies any vomiting but does report nausea. Reports that his

abdominal pain has had significant improvement. Denies any chest pain, shortness

of breath, palpitations. Reports that he is hungry.





Objective:


Vitals (See below)


General: Patient is lying in bed, appears to be comfortable, not in any acute 

distress, is oriented to person, place and time


HEENT: NC, AT


CVS: +S1S2


Lungs: Fair air entry b/l, no evidence of wheezing, rhonchi or rales


Abdomen: Soft, nondistended and nontender


Extremities: No evidence of edema, - Calf tenderness





Imaging:


CT abdomen / pelvis 5/6:


No acute abdominopelvic pathology appreciated. Benign right adrenal adenoma 

remains stable compared with 2018.





CTA chest 5/6:


Normal thoracic aorta without aneurysm or dissection. No evidence for pulmonary 

embolus.





CXR 5/6:


Elevated left hemidiaphragm. Cephalization. No infiltrate seen.





Liver US 5/6:


Small right adrenal nodule noted as on CT.  Evidence of fatty infiltration of 

the liver.





Assessment and plan:


Abdominal / back pain - possibly 2/2 acute pancreatitis, possibly 2/2 Cannibis 

induced hyperemesis syndrome, less likely 2/2 colitis 


- Patient is reporting progressive back/abdominal pain that has been progressing

over the duration of 1 week


- This morning patient has reported improvement of her symptoms. No more 

vomiting reported


- Lipase was noted to be elevated >3x upper limit of normal


- Triglyceride / ethanol level are wnl; Urine drug screen positive for Opiates /

Cannabinoids 


- Imaging without any significant abnormalities


- Will advance diet to clears today 


- Will reduce rate of fluids; will reduce dose / frequency of Morphine 





Reported dark stools


- No evidence of bleeding 


- Hg has trended down - possibly 2/2 dilutional etiology


- c/w Protonix / Carafate 





s/p Leukocytosis 


- Review of systems is negative for any source of infection


- Blood cultures 5/6: Pending


- Pro-calcitonin pending 


- Will hold off on antibiotics at this time 





Reported CVA (2017) 


- Currently not on ASA 





Testicular cancer 


- Seminoma Diagnosed in 2017


- s/p Left orchiectomy 2017 with 2 sessions of radiation





Anxiety


- Currently not on any medications





DVT prophylaxis 


- c/w TEDs/Sequentials





Disposition:


- Anticipate discharge within 24-48 hours





VS,Fishbone, I+O


VS, Fishbone, I+O


Laboratory Tests


5/6/21 12:04








5/6/21 17:59








5/7/21 00:05








5/7/21 04:57











Vital Signs








  Date Time  Temp Pulse Resp B/P (MAP) Pulse Ox O2 Delivery O2 Flow Rate FiO2


 


5/7/21 09:01   20   Room Air  


 


5/7/21 08:00 97.2 70  136/81 (99) 96   














I&O- Last 24 Hours up to 6 AM 


 


 5/7/21





 06:00


 


Intake Total 4380 ml


 


Output Total 4100 ml


 


Balance 280 ml

















CHEPE RINCON MD                 May 7, 2021 09:47

## 2021-05-08 VITALS — SYSTOLIC BLOOD PRESSURE: 132 MMHG | DIASTOLIC BLOOD PRESSURE: 81 MMHG

## 2021-05-08 VITALS — SYSTOLIC BLOOD PRESSURE: 129 MMHG | DIASTOLIC BLOOD PRESSURE: 87 MMHG

## 2021-05-08 VITALS — DIASTOLIC BLOOD PRESSURE: 80 MMHG | SYSTOLIC BLOOD PRESSURE: 124 MMHG

## 2021-05-08 VITALS — SYSTOLIC BLOOD PRESSURE: 133 MMHG | DIASTOLIC BLOOD PRESSURE: 60 MMHG

## 2021-05-08 VITALS — DIASTOLIC BLOOD PRESSURE: 90 MMHG | SYSTOLIC BLOOD PRESSURE: 145 MMHG

## 2021-05-08 VITALS — DIASTOLIC BLOOD PRESSURE: 93 MMHG | SYSTOLIC BLOOD PRESSURE: 147 MMHG

## 2021-05-08 LAB
BASOPHILS # BLD AUTO: 0 10^3/UL (ref 0–0.2)
BASOPHILS NFR BLD AUTO: 0.2 % (ref 0–1)
BUN SERPL-MCNC: 5 MG/DL (ref 7–18)
CALCIUM SERPL-MCNC: 8.6 MG/DL (ref 8.5–10.1)
CHLORIDE SERPL-SCNC: 109 MEQ/L (ref 98–107)
CO2 SERPL-SCNC: 28 MEQ/L (ref 21–32)
CREAT SERPL-MCNC: 0.66 MG/DL (ref 0.7–1.3)
EOSINOPHIL # BLD AUTO: 0.1 10^3/UL (ref 0–0.5)
EOSINOPHIL NFR BLD AUTO: 1.1 % (ref 0–3)
GFR SERPL CREATININE-BSD FRML MDRD: > 60 ML/MIN/{1.73_M2} (ref 60–?)
GLUCOSE SERPL-MCNC: 92 MG/DL (ref 70–100)
HCT VFR BLD AUTO: 39.3 % (ref 42–52)
HCT VFR BLD AUTO: 40 % (ref 42–52)
HCT VFR BLD AUTO: 41.5 % (ref 42–52)
HGB BLD-MCNC: 12.7 G/DL (ref 13.5–17.5)
HGB BLD-MCNC: 12.8 G/DL (ref 13.5–17.5)
HGB BLD-MCNC: 12.9 G/DL (ref 13.5–17.5)
LYMPHOCYTES # BLD AUTO: 1.8 10^3/UL (ref 1.5–5)
LYMPHOCYTES NFR BLD AUTO: 19.2 % (ref 24–44)
MAGNESIUM SERPL-MCNC: 2 MG/DL (ref 1.8–2.4)
MCH RBC QN AUTO: 29.9 PG (ref 27–33)
MCHC RBC AUTO-ENTMCNC: 30.8 G/DL (ref 32–36.5)
MCV RBC AUTO: 97 FL (ref 80–96)
MONOCYTES # BLD AUTO: 1.1 10^3/UL (ref 0–0.8)
MONOCYTES NFR BLD AUTO: 11.2 % (ref 2–8)
NEUTROPHILS # BLD AUTO: 6.4 10^3/UL (ref 1.5–8.5)
NEUTROPHILS NFR BLD AUTO: 67.8 % (ref 36–66)
PLATELET # BLD AUTO: 236 10^3/UL (ref 150–450)
POTASSIUM SERPL-SCNC: 3.3 MEQ/L (ref 3.5–5.1)
RBC # BLD AUTO: 4.28 10^6/UL (ref 4.3–6.1)
SODIUM SERPL-SCNC: 140 MEQ/L (ref 136–145)
WBC # BLD AUTO: 9.4 10^3/UL (ref 4–10)

## 2021-05-08 RX ADMIN — DEXTROSE MONOHYDRATE SCH MG: 50 INJECTION, SOLUTION INTRAVENOUS at 00:23

## 2021-05-08 RX ADMIN — ONDANSETRON SCH MG: 2 INJECTION INTRAMUSCULAR; INTRAVENOUS at 05:14

## 2021-05-08 RX ADMIN — SODIUM CHLORIDE SCH MLS/HR: 9 INJECTION, SOLUTION INTRAVENOUS at 00:22

## 2021-05-08 RX ADMIN — SUCRALFATE SCH GM: 1 TABLET ORAL at 16:33

## 2021-05-08 RX ADMIN — CYCLOBENZAPRINE HYDROCHLORIDE SCH MG: 10 TABLET, FILM COATED ORAL at 20:32

## 2021-05-08 RX ADMIN — ONDANSETRON SCH MG: 2 INJECTION INTRAMUSCULAR; INTRAVENOUS at 00:23

## 2021-05-08 RX ADMIN — SUCRALFATE SCH GM: 1 TABLET ORAL at 12:55

## 2021-05-08 RX ADMIN — DOCUSATE SODIUM SCH MG: 100 CAPSULE, LIQUID FILLED ORAL at 09:00

## 2021-05-08 RX ADMIN — ONDANSETRON SCH MG: 2 INJECTION INTRAMUSCULAR; INTRAVENOUS at 08:09

## 2021-05-08 RX ADMIN — ACETAMINOPHEN PRN MG: 325 TABLET ORAL at 08:13

## 2021-05-08 RX ADMIN — ONDANSETRON SCH MG: 2 INJECTION INTRAMUSCULAR; INTRAVENOUS at 20:32

## 2021-05-08 RX ADMIN — SUCRALFATE SCH GM: 1 TABLET ORAL at 08:13

## 2021-05-08 RX ADMIN — SUCRALFATE SCH GM: 1 TABLET ORAL at 20:32

## 2021-05-08 RX ADMIN — ONDANSETRON SCH MG: 2 INJECTION INTRAMUSCULAR; INTRAVENOUS at 16:33

## 2021-05-08 RX ADMIN — DOCUSATE SODIUM SCH MG: 100 CAPSULE, LIQUID FILLED ORAL at 20:32

## 2021-05-08 RX ADMIN — DEXTROSE MONOHYDRATE SCH MG: 50 INJECTION, SOLUTION INTRAVENOUS at 12:55

## 2021-05-08 RX ADMIN — ONDANSETRON SCH MG: 2 INJECTION INTRAMUSCULAR; INTRAVENOUS at 12:55

## 2021-05-08 RX ADMIN — MORPHINE SULFATE PRN MG: 2 INJECTION, SOLUTION INTRAMUSCULAR; INTRAVENOUS at 05:21

## 2021-05-08 NOTE — IPNPDOC
Text Note


Date of Service


The patient was seen on 5/8/21.





NOTE


Subjective:


Patient is a 30-year-old  male who presented to the emergency room with

complaints of persistent back and abdominal pain. Patient has been experiencing 

back/abdominal pain since last week Wednesday and has progressed to nausea and 

vomiting. Upon arrival to emergency room, patient was found to have an elevated 

lipase and was admitted to the hospitalist service for suspected acute 

pancreatitis.





Patient was seen and examined at the bedside. Patient has reported that he 

continues to experience lower back and bilateral lower abdominal pain had 

reported an episode of vomiting yesterday. Denies any chest pain, shortness 

breath, palpitations. Denies any urinary discomfort. This morning patient had 

reported blood in his stool that occurred at 4 AM. No bowel movements since that

point.





Objective:


Vitals (See below)


General: Patient is laying flat in bed, reports some back and abdominal 

discomfort is oriented to person, place and time


HEENT: No cephalic and atraumatic


CVS: +S1S2


Lungs: Air entry is fair bilaterally without any evidence of wheezing, crackles 

or rhonchi


Abdomen: Abdomen remains soft without any distention, there is tenderness at 

bilateral lower quadrants


Extremities: LE are without edema 





Imaging:


CT abdomen / pelvis 5/6:


No acute abdominopelvic pathology appreciated. Benign right adrenal adenoma 

remains stable compared with 2018.





CTA chest 5/6:


Normal thoracic aorta without aneurysm or dissection. No evidence for pulmonary 

embolus.





CXR 5/6:


Elevated left hemidiaphragm. Cephalization. No infiltrate seen.





Liver US 5/6:


Small right adrenal nodule noted as on CT.  Evidence of fatty infiltration of 

the liver.





CTA abdomen / pelvis 5/8:


1. Small amount of radiodense material in the stomach, particularly at the 

gastric fundus, most likely recently ingested radiodense material such as 

bismuth.  Recommend correlation with recent ingestions, as intraluminal gastric 

hemorrhage could alternatively cause this appearance.  Also suggest correlation 

with patient history of melena or rectal bleeding.  Fecal occult blood testing 

could be considered at clinical discretion.  Smaller foci of increased density 

in mid small bowel and transverse colon are likely small volume radiodense 

ingested material, which has clearly moved in the colon since 05/06/2021.


2. Multiple small lymph nodes in the abdomen and pelvis, 1 of which is mildly 

enlarged in the portacaval region, similar to recent studies but slightly 

increased in size since 01/12/2018.  Small nonenlarged nodes in the 

peripancreatic and periportal region are similar to recent studies but slightly 

larger than on 01/12/2018.


3. Diffuse fatty liver change.


4. Right adrenal mass which is stable in size dating back to 01/12/2018, likely 

a lipid poor adenoma.


5. Small noninflamed fat containing umbilical hernia likely present.


6. No atherosclerosis or arterial occlusion noted.  





Assessment and plan:


Abdominal / back pain - possibly 2/2 acute pancreatitis, possibly 2/2 Cannibis 

induced hyperemesis syndrome, less likely 2/2 colitis 


- Patient is reporting progressive back/abdominal pain that has been progressing

over the duration of 1 week


- Improvement in symptoms yesterday; but still reporting worsening pain today 


- Lipase was noted to be elevated >3x upper limit of normal


- Triglyceride / ethanol level are wnl; Urine drug screen positive for Opiates /

Cannabinoids 


- CTA completed today with questionable radiodense material in stomach


- Will get MRI LS spine today 


- Will keep patient NPO for now 


- s/p IV fluids


- Will adjust pain contort 


- Consulted gastroenterology for further input 





Reported dark stools


- Reported blood in stool this morning 


- Hg has trended down - possibly 2/2 dilutional etiology; but has remained 

stable thus far


- Will continue H&H trend 


- Occult blood negative


- c/w Protonix / Carafate 





s/p Leukocytosis 


- Review of systems is negative for any source of infection


- Blood cultures 5/6: Pending


- Pro-calcitonin negative 


- Will hold off on antibiotics at this time 





Reported CVA (2017) 


- Currently not on ASA 





Right adrenal mass 


- Imaging reports stable in size dating back to 01/12/2018, likely a lipid poor 

adenoma


- Will have outpatient follow up with PCP 





Testicular cancer 


- Seminoma Diagnosed in 2017


- s/p Left orchiectomy 2017 with 2 sessions of radiation





Anxiety


- Currently not on any medications





DVT prophylaxis 


- c/w TEDs/Sequentials





Disposition:


- Awaiting clinical improvement





Delicia DAUGHERTY, I+O


Delicia DAUGHERTY I+O


Laboratory Tests


5/8/21 04:55











Vital Signs








  Date Time  Temp Pulse Resp B/P (MAP) Pulse Ox O2 Delivery O2 Flow Rate FiO2


 


5/8/21 09:31   18   Room Air  


 


5/8/21 08:00 97.4 71  132/81 (98) 95   














I&O- Last 24 Hours up to 6 AM 


 


 5/8/21





 06:00


 


Intake Total 4500 ml


 


Output Total 4650 ml


 


Balance -150 ml

















CHEPE RINCON MD                 May 8, 2021 11:05

## 2021-05-08 NOTE — REPVR
PROCEDURE INFORMATION: 

Exam: CT Angiography Abdomen and Pelvis With Contrast, GI Bleeding 

Exam date and time: 5/8/2021 9:26 AM 

Age: 30 years old 

Clinical indication: Other: Evaluate for ischemic colitis 



TECHNIQUE: 

Imaging protocol: Computed tomographic angiography of the abdomen and pelvis 

with contrast. 

3D rendering (Not supervised by radiologist): MIP and/or 3D reconstructed 

images were created by the technologist. 

Radiation optimization: All CT scans at this facility use at least one of these 

dose optimization techniques: automated exposure control; mA and/or kV 

adjustment per patient size (includes targeted exams where dose is matched to 

clinical indication); or iterative reconstruction. 

Contrast material: ISOVUE 370; Contrast volume: 100 ml; Contrast route: 

INTRAVENOUS (IV);  



COMPARISON: 

1. CT ABD/PEL W/IV CONTRAST ONLY 5/6/2021 9:16 AM 

2. CT ABD/PEL W/IV ORAL CONTRAS 5/1/2021 3:04:03 PM 

3. CT ABD PELVIS W/O FOL BY WIT 1/12/2018 2:18:17 PM 



FINDINGS: 

Lungs: There is bibasilar discoid atelectasis or scar. There is also increased 

lingular opacity at the lung base, favor nodular atelectasis although pneumonia 

could contribute to this appearance. 



Aorta: No aortic aneurysm. No aortic dissection. 

Celiac trunk and mesenteric arteries: No occlusion or significant stenosis. 

Renal arteries: No occlusion or significant stenosis. 

Right iliac arteries: No occlusion or significant stenosis. 

Left iliac arteries: No occlusion or significant stenosis. 



Liver: There is diffuse fatty liver change with minimal focal sparing at the 

gallbladder bed. 

Gallbladder and bile ducts: There is vicarious contrast excretion in the 

gallbladder. No biliary ductal dilatation. 

Pancreas: Unremarkable. No mass. No ductal dilation. 

Spleen: Unremarkable. No splenomegaly. 

Adrenal glands: 2.8 x 1.3 cm right adrenal mass with Hounsfield units from 

42-50. This is more dense than its expected for a fat containing adenoma, but 

is stable in size dating back to 01/12/2018 and therefore most likely a lipid 

poor adenoma. Left adrenal gland is unremarkable. 

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 

Stomach and bowel: There is a small amount of new radiodense material in the 

gastric fundus and less so in the more distal stomach. Small radiodense foci in 

the true, 3 in number, measuring 2-6 mm, which appear to be ingested radiodense 

material as 3 foci were located more proximally in the right colon on 

05/06/2021. There is no bowel dilatation to indicate obstruction. No 

pneumatosis. Small amount of radiodense material into segments of a mid small 

bowel loop in the left abdomen on series 401, images 84 and 81, which may be 

ingested radiodense material previously seen in the distal stomach. 

Appendix:  Appendix is not seen.  No pericecal inflammatory process.

Intraperitoneal space: Unremarkable. No free air. No significant fluid 

collection. 

Lymph nodes: There are multiple small periaortic lymph nodes particularly on 

the left, similar in size to prior studies. There also very small central 

mesenteric and right lower quadrant nodes. Portacaval lymph node is mildly 

enlarged at 12 mm, similar to recent studies, but slightly larger than on 

01/12/2018. There are small peripancreatic and periportal nodes similar to 

recent studies but slightly larger than on 01/12/2018. There are small 

bilateral inguinal lymph nodes. 



Urinary bladder: Unremarkable. No mass. 

Reproductive:  The vessels of the left spermatic cord truncated rather abruptly 

in the left inguinal region, a subtle finding.  Review of older studies shows a 

history of testicular cancer and left orchiectomy.

Bones/joints: No acute osseous abnormality. 

Soft tissues:  The appearance of hazy increased density in the subcutaneous fat 

in the right flank may be artifactual, at the edge of the field of view and 

only partially included.  Small noninflamed fat containing umbilical hernia.



IMPRESSION: 

1. Small amount of radiodense material in the stomach, particularly at the 

gastric fundus, most likely recently ingested radiodense material such as 

bismuth.  Recommend correlation with recent ingestions, as intraluminal gastric 

hemorrhage could alternatively cause this appearance.  Also suggest correlation 

with patient history of melena or rectal bleeding.  Fecal occult blood testing 

could be considered at clinical discretion.  Smaller foci of increased density 

in mid small bowel and transverse colon are likely small volume radiodense 

ingested material, which has clearly moved in the colon since 05/06/2021.

2. Multiple small lymph nodes in the abdomen and pelvis, 1 of which is mildly 

enlarged in the portacaval region, similar to recent studies but slightly 

increased in size since 01/12/2018.  Small nonenlarged nodes in the 

peripancreatic and periportal region are similar to recent studies but slightly 

larger than on 01/12/2018.

3. Diffuse fatty liver change.

4. Right adrenal mass which is stable in size dating back to 01/12/2018, likely 

a lipid poor adenoma.

5. Small noninflamed fat containing umbilical hernia likely present.

6. No atherosclerosis or arterial occlusion noted.  



Electronically signed by: Tammy Bowles On 05/08/2021  10:43:12 AM

## 2021-05-08 NOTE — REPVR
PROCEDURE INFORMATION: 

Exam: MR Lumbar Spine Without Contrast 

Exam date and time: 5/8/2021 12:30 PM 

Age: 30 years old 

Clinical indication: Low back pain; Additional info: Severe lower back pain 



TECHNIQUE: 

Imaging protocol: Multiplanar magnetic resonance images of the lumbar spine 

without intravenous contrast. 



COMPARISON: 

No relevant prior studies available. 



FINDINGS: 

Vertebrae: Unremarkable. 

Spinal cord: Normal signal. No cord compression. 

L1-L2:  No significant disc disease. No significant spinal canal stenosis. No 

neural foraminal stenosis. 

L2-L3:  No significant disc disease. No significant spinal canal stenosis. No 

neural foraminal stenosis. 

L3-L4:  No significant disc disease. No significant spinal canal stenosis. No 

neural foraminal stenosis. 

L4-L5: There is mild disc bulging. Disc bulging extends into both neural 

foramen causing mild bilateral neural foraminal narrowing. 

L5-S1:  No significant disc disease. No significant spinal canal stenosis. No 

neural foraminal stenosis. 

Soft tissues: Unremarkable. 



IMPRESSION: 

Unremarkable spine. Please see details above.





Electronically signed by: Abad Dudley On 05/08/2021  13:24:04 PM

## 2021-05-09 VITALS — SYSTOLIC BLOOD PRESSURE: 110 MMHG | DIASTOLIC BLOOD PRESSURE: 60 MMHG

## 2021-05-09 VITALS — DIASTOLIC BLOOD PRESSURE: 77 MMHG | SYSTOLIC BLOOD PRESSURE: 142 MMHG

## 2021-05-09 VITALS — SYSTOLIC BLOOD PRESSURE: 128 MMHG | DIASTOLIC BLOOD PRESSURE: 74 MMHG

## 2021-05-09 VITALS — DIASTOLIC BLOOD PRESSURE: 71 MMHG | SYSTOLIC BLOOD PRESSURE: 149 MMHG

## 2021-05-09 LAB
BASOPHILS # BLD AUTO: 0 10^3/UL (ref 0–0.2)
BASOPHILS NFR BLD AUTO: 0.3 % (ref 0–1)
BUN SERPL-MCNC: 5 MG/DL (ref 7–18)
CALCIUM SERPL-MCNC: 8.6 MG/DL (ref 8.5–10.1)
CHLORIDE SERPL-SCNC: 107 MEQ/L (ref 98–107)
CO2 SERPL-SCNC: 29 MEQ/L (ref 21–32)
CREAT SERPL-MCNC: 0.66 MG/DL (ref 0.7–1.3)
EOSINOPHIL # BLD AUTO: 0.2 10^3/UL (ref 0–0.5)
EOSINOPHIL NFR BLD AUTO: 1.6 % (ref 0–3)
GFR SERPL CREATININE-BSD FRML MDRD: > 60 ML/MIN/{1.73_M2} (ref 60–?)
GLUCOSE SERPL-MCNC: 87 MG/DL (ref 70–100)
HCT VFR BLD AUTO: 38.5 % (ref 42–52)
HCT VFR BLD AUTO: 42.8 % (ref 42–52)
HGB BLD-MCNC: 12.4 G/DL (ref 13.5–17.5)
HGB BLD-MCNC: 13.4 G/DL (ref 13.5–17.5)
LYMPHOCYTES # BLD AUTO: 1.9 10^3/UL (ref 1.5–5)
LYMPHOCYTES NFR BLD AUTO: 19.8 % (ref 24–44)
MAGNESIUM SERPL-MCNC: 2.2 MG/DL (ref 1.8–2.4)
MCH RBC QN AUTO: 29.6 PG (ref 27–33)
MCHC RBC AUTO-ENTMCNC: 32.2 G/DL (ref 32–36.5)
MCV RBC AUTO: 91.9 FL (ref 80–96)
MONOCYTES # BLD AUTO: 1.2 10^3/UL (ref 0–0.8)
MONOCYTES NFR BLD AUTO: 12.3 % (ref 2–8)
NEUTROPHILS # BLD AUTO: 6.4 10^3/UL (ref 1.5–8.5)
NEUTROPHILS NFR BLD AUTO: 65.3 % (ref 36–66)
PLATELET # BLD AUTO: 273 10^3/UL (ref 150–450)
POTASSIUM SERPL-SCNC: 3.3 MEQ/L (ref 3.5–5.1)
RBC # BLD AUTO: 4.19 10^6/UL (ref 4.3–6.1)
SODIUM SERPL-SCNC: 140 MEQ/L (ref 136–145)
WBC # BLD AUTO: 9.8 10^3/UL (ref 4–10)

## 2021-05-09 RX ADMIN — SUCRALFATE SCH GM: 1 TABLET ORAL at 20:34

## 2021-05-09 RX ADMIN — DOCUSATE SODIUM SCH MG: 100 CAPSULE, LIQUID FILLED ORAL at 09:08

## 2021-05-09 RX ADMIN — ONDANSETRON SCH MG: 2 INJECTION INTRAMUSCULAR; INTRAVENOUS at 06:34

## 2021-05-09 RX ADMIN — DEXTROSE MONOHYDRATE SCH MG: 50 INJECTION, SOLUTION INTRAVENOUS at 12:19

## 2021-05-09 RX ADMIN — ONDANSETRON SCH MG: 2 INJECTION INTRAMUSCULAR; INTRAVENOUS at 12:19

## 2021-05-09 RX ADMIN — IBUPROFEN PRN MG: 400 TABLET, FILM COATED ORAL at 18:14

## 2021-05-09 RX ADMIN — ONDANSETRON SCH MG: 2 INJECTION INTRAMUSCULAR; INTRAVENOUS at 09:09

## 2021-05-09 RX ADMIN — CYCLOBENZAPRINE HYDROCHLORIDE SCH MG: 10 TABLET, FILM COATED ORAL at 09:08

## 2021-05-09 RX ADMIN — ONDANSETRON SCH MG: 2 INJECTION INTRAMUSCULAR; INTRAVENOUS at 16:32

## 2021-05-09 RX ADMIN — ONDANSETRON SCH MG: 2 INJECTION INTRAMUSCULAR; INTRAVENOUS at 00:26

## 2021-05-09 RX ADMIN — DEXTROSE MONOHYDRATE SCH MG: 50 INJECTION, SOLUTION INTRAVENOUS at 00:26

## 2021-05-09 RX ADMIN — CYCLOBENZAPRINE HYDROCHLORIDE SCH MG: 10 TABLET, FILM COATED ORAL at 20:34

## 2021-05-09 RX ADMIN — DOCUSATE SODIUM SCH MG: 100 CAPSULE, LIQUID FILLED ORAL at 20:34

## 2021-05-09 RX ADMIN — SUCRALFATE SCH GM: 1 TABLET ORAL at 09:09

## 2021-05-09 RX ADMIN — ONDANSETRON SCH MG: 2 INJECTION INTRAMUSCULAR; INTRAVENOUS at 20:34

## 2021-05-09 RX ADMIN — SUCRALFATE SCH GM: 1 TABLET ORAL at 16:32

## 2021-05-09 RX ADMIN — SUCRALFATE SCH GM: 1 TABLET ORAL at 12:19

## 2021-05-09 NOTE — IPNPDOC
Text Note


Date of Service


The patient was seen on 5/9/21.





NOTE


Subjective:


Patient is a 30-year-old  male who presented to the emergency room with

complaints of persistent back and abdominal pain. Patient has been experiencing 

back/abdominal pain since last week Wednesday and has progressed to nausea and 

vomiting. Upon arrival to emergency room, patient was found to have an elevated 

lipase and was admitted to the hospitalist service for suspected acute 

pancreatitis.





Patient was seen and examined at the bedside. Again. Patient is still reporting 

some back and lower abdominal discomfort. Has not spent any nausea, vomiting, 

chest pain, short of breath, palpitations. Reports that he is not experiencing 

any further bloody bowel movements. Denies any urinary discomfort.





Objective:


Vitals (See below)


General: Patient is laying flat on his back, does not appear to be any distress,

oriented to person, place and time


HEENT: Atraumatic and normocephalic


CVS: +S1S2


Lungs: Air entry is fair bilaterally, again without any appreciated, rhonchi, 

crackles or wheezing on auscultation


Abdomen: Some abdominal tenderness is noted in bilateral lower quadrants. No 

distention, remains soft, obese


Extremities: No evidence of lower extremity edema





Imaging:


CT abdomen / pelvis 5/6:


No acute abdominopelvic pathology appreciated. Benign right adrenal adenoma 

remains stable compared with 2018.





CTA chest 5/6:


Normal thoracic aorta without aneurysm or dissection. No evidence for pulmonary 

embolus.





CXR 5/6:


Elevated left hemidiaphragm. Cephalization. No infiltrate seen.





Liver US 5/6:


Small right adrenal nodule noted as on CT.  Evidence of fatty infiltration of 

the liver.





CTA abdomen / pelvis 5/8:


1. Small amount of radiodense material in the stomach, particularly at the 

gastric fundus, most likely recently ingested radiodense material such as 

bismuth.  Recommend correlation with recent ingestions, as intraluminal gastric 

hemorrhage could alternatively cause this appearance.  Also suggest correlation 

with patient history of melena or rectal bleeding.  Fecal occult blood testing 

could be considered at clinical discretion.  Smaller foci of increased density 

in mid small bowel and transverse colon are likely small volume radiodense 

ingested material, which has clearly moved in the colon since 05/06/2021.


2. Multiple small lymph nodes in the abdomen and pelvis, 1 of which is mildly 

enlarged in the portacaval region, similar to recent studies but slightly 

increased in size since 01/12/2018.  Small nonenlarged nodes in the peripancreat

ic and periportal region are similar to recent studies but slightly larger than 

on 01/12/2018.


3. Diffuse fatty liver change.


4. Right adrenal mass which is stable in size dating back to 01/12/2018, likely 

a lipid poor adenoma.


5. Small noninflamed fat containing umbilical hernia likely present.


6. No atherosclerosis or arterial occlusion noted.  





MRI LS Spine 5/8:


Unremarkable spine. Please see details above.





Assessment and plan:


Abdominal / back pain - likely 2/2 musculoskeletal pain 


- Patient is reporting progressive back/abdominal pain that has been progressing

over the duration of 1 week


- Has had some improvement in pain 


- Lipase was noted to be elevated >3x upper limit of normal; however - likely 

2/2 nausea and vomiting 


- Triglyceride / ethanol level are wnl; Urine drug screen positive for Opiates /

Cannabinoids 


- Imaging noted above 


- c/w Clear liquids  


- s/p IV fluids


- c/w NSAIDS and muscle relaxants 





Reported dark stools


- Yesterday patient reported bloody bowel movement. However, does not express 

any further such episodes


- Hg has remained stable through this entire hospitalization


- Occult blood negative


- c/w Protonix / Carafate 


- Gastroenterology on consultation; we appreciate their input; plan for EGD and 

colonoscopy tomorrow





s/p Leukocytosis 


- Review of systems is negative for any source of infection


- Blood cultures 5/6: Pending


- Pro-calcitonin negative 


- Will hold off on antibiotics at this time 





Reported CVA (2017) 


- Currently not on ASA 





Right adrenal mass 


- Imaging reports stable in size dating back to 01/12/2018, likely a lipid poor 

adenoma


- Will have outpatient follow up with PCP 





Testicular cancer 


- Seminoma Diagnosed in 2017


- s/p Left orchiectomy 2017 with 2 sessions of radiation





Anxiety


- Currently not on any medications





DVT prophylaxis 


- c/w TEDs/Sequentials





Disposition:


- Awaiting clinical improvement





VSDelicia, I+O


VS, Delicia, I+O


Laboratory Tests


5/8/21 11:41








5/8/21 17:54








5/9/21 00:11








5/9/21 04:42











Vital Signs








  Date Time  Temp Pulse Resp B/P (MAP) Pulse Ox O2 Delivery O2 Flow Rate FiO2


 


5/9/21 00:57   20  94 Room Air  


 


5/9/21 00:00 97.3 70  142/77 (98)    














I&O- Last 24 Hours up to 6 AM 


 


 5/9/21





 05:59


 


Intake Total 955 ml


 


Output Total 0 ml


 


Balance 955 ml

















CHEPE RINCON MD                 May 9, 2021 09:14

## 2021-05-10 VITALS — DIASTOLIC BLOOD PRESSURE: 71 MMHG | SYSTOLIC BLOOD PRESSURE: 112 MMHG

## 2021-05-10 VITALS — DIASTOLIC BLOOD PRESSURE: 85 MMHG | SYSTOLIC BLOOD PRESSURE: 129 MMHG

## 2021-05-10 VITALS — SYSTOLIC BLOOD PRESSURE: 129 MMHG | DIASTOLIC BLOOD PRESSURE: 65 MMHG

## 2021-05-10 VITALS — DIASTOLIC BLOOD PRESSURE: 78 MMHG | SYSTOLIC BLOOD PRESSURE: 125 MMHG

## 2021-05-10 LAB
BASOPHILS # BLD AUTO: 0 10^3/UL (ref 0–0.2)
BASOPHILS NFR BLD AUTO: 0.4 % (ref 0–1)
BUN SERPL-MCNC: 4 MG/DL (ref 7–18)
CALCIUM SERPL-MCNC: 8.5 MG/DL (ref 8.5–10.1)
CHLORIDE SERPL-SCNC: 109 MEQ/L (ref 98–107)
CO2 SERPL-SCNC: 28 MEQ/L (ref 21–32)
CREAT SERPL-MCNC: 0.79 MG/DL (ref 0.7–1.3)
EOSINOPHIL # BLD AUTO: 0.1 10^3/UL (ref 0–0.5)
EOSINOPHIL NFR BLD AUTO: 1.7 % (ref 0–3)
GFR SERPL CREATININE-BSD FRML MDRD: > 60 ML/MIN/{1.73_M2} (ref 60–?)
GLUCOSE SERPL-MCNC: 91 MG/DL (ref 70–100)
HCT VFR BLD AUTO: 38.8 % (ref 42–52)
HGB BLD-MCNC: 12.4 G/DL (ref 13.5–17.5)
LYMPHOCYTES # BLD AUTO: 1.8 10^3/UL (ref 1.5–5)
LYMPHOCYTES NFR BLD AUTO: 22.5 % (ref 24–44)
MAGNESIUM SERPL-MCNC: 2.1 MG/DL (ref 1.8–2.4)
MCH RBC QN AUTO: 29.5 PG (ref 27–33)
MCHC RBC AUTO-ENTMCNC: 32 G/DL (ref 32–36.5)
MCV RBC AUTO: 92.4 FL (ref 80–96)
MONOCYTES # BLD AUTO: 1.2 10^3/UL (ref 0–0.8)
MONOCYTES NFR BLD AUTO: 14.3 % (ref 2–8)
NEUTROPHILS # BLD AUTO: 4.9 10^3/UL (ref 1.5–8.5)
NEUTROPHILS NFR BLD AUTO: 60.4 % (ref 36–66)
PLATELET # BLD AUTO: 289 10^3/UL (ref 150–450)
POTASSIUM SERPL-SCNC: 3.7 MEQ/L (ref 3.5–5.1)
RBC # BLD AUTO: 4.2 10^6/UL (ref 4.3–6.1)
SODIUM SERPL-SCNC: 143 MEQ/L (ref 136–145)
WBC # BLD AUTO: 8.2 10^3/UL (ref 4–10)

## 2021-05-10 PROCEDURE — 0DBN8ZX EXCISION OF SIGMOID COLON, VIA NATURAL OR ARTIFICIAL OPENING ENDOSCOPIC, DIAGNOSTIC: ICD-10-PCS | Performed by: INTERNAL MEDICINE

## 2021-05-10 RX ADMIN — ONDANSETRON SCH MG: 2 INJECTION INTRAMUSCULAR; INTRAVENOUS at 12:05

## 2021-05-10 RX ADMIN — ONDANSETRON SCH MG: 2 INJECTION INTRAMUSCULAR; INTRAVENOUS at 05:01

## 2021-05-10 RX ADMIN — IBUPROFEN PRN MG: 400 TABLET, FILM COATED ORAL at 05:01

## 2021-05-10 RX ADMIN — ACETAMINOPHEN PRN MG: 325 TABLET ORAL at 16:15

## 2021-05-10 RX ADMIN — ONDANSETRON SCH MG: 2 INJECTION INTRAMUSCULAR; INTRAVENOUS at 09:00

## 2021-05-10 RX ADMIN — DOCUSATE SODIUM SCH MG: 100 CAPSULE, LIQUID FILLED ORAL at 09:00

## 2021-05-10 RX ADMIN — SUCRALFATE SCH GM: 1 TABLET ORAL at 09:31

## 2021-05-10 RX ADMIN — DEXTROSE MONOHYDRATE SCH MG: 50 INJECTION, SOLUTION INTRAVENOUS at 01:13

## 2021-05-10 RX ADMIN — SUCRALFATE SCH GM: 1 TABLET ORAL at 18:45

## 2021-05-10 RX ADMIN — SUCRALFATE SCH GM: 1 TABLET ORAL at 11:30

## 2021-05-10 RX ADMIN — IBUPROFEN PRN MG: 400 TABLET, FILM COATED ORAL at 14:26

## 2021-05-10 RX ADMIN — ONDANSETRON SCH MG: 2 INJECTION INTRAMUSCULAR; INTRAVENOUS at 01:13

## 2021-05-10 RX ADMIN — ONDANSETRON SCH MG: 2 INJECTION INTRAMUSCULAR; INTRAVENOUS at 18:40

## 2021-05-10 RX ADMIN — CYCLOBENZAPRINE HYDROCHLORIDE SCH MG: 10 TABLET, FILM COATED ORAL at 11:30

## 2021-05-10 RX ADMIN — DEXTROSE MONOHYDRATE SCH MG: 50 INJECTION, SOLUTION INTRAVENOUS at 11:29

## 2021-05-10 NOTE — ROOR
________________________________________________________________________________

Patient Name: Rasta John         Procedure Date: 5/10/2021 4:59 PM

MRN: [id number]                          Account Number: [id number]

YOB: 1990              Age: 30

Room: Main OR                          Gender: Male

Note Status: Finalized                 

________________________________________________________________________________

 

Procedure:            Colonoscopy

Indications:          Hematochezia, Rectal bleeding

Providers:            Mychal Márquez MD

Referring MD:         2. Inpatient 2. Inpatient

Requesting Provider:  

Medicines:            Monitored Anesthesia Care

Complications:        No immediate complications.

________________________________________________________________________________

Procedure:            Pre-Anesthesia Assessment:

                      - Prior to the procedure, a History and Physical was 

                      performed, and patient medications and allergies were 

                      reviewed. The patient is competent. The risks and 

                      benefits of the procedure and the sedation options and 

                      risks were discussed with the patient. All questions 

                      were answered and informed consent was obtained. Patient 

                      identification and proposed procedure were verified by 

                      the physician, the nurse and the anesthesiologist in the 

                      procedure room. Mental Status Examination: alert and 

                      oriented. Airway Examination: normal oropharyngeal 

                      airway and neck mobility. Respiratory Examination: clear 

                      to auscultation. CV Examination: normal. Prophylactic 

                      Antibiotics: The patient does not require prophylactic 

                      antibiotics. Prior Anticoagulants: The patient has taken 

                      no previous anticoagulant or antiplatelet agents. ASA 

                      Grade Assessment: III - A patient with severe systemic 

                      disease. After reviewing the risks and benefits, the 

                      patient was deemed in satisfactory condition to undergo 

                      the procedure. The anesthesia plan was to use monitored 

                      anesthesia care (MAC). Immediately prior to 

                      administration of medications, the patient was 

                      re-assessed for adequacy to receive sedatives. The heart 

                      rate, respiratory rate, oxygen saturations, blood 

                      pressure, adequacy of pulmonary ventilation, and 

                      response to care were monitored throughout the 

                      procedure. The physical status of the patient was 

                      re-assessed after the procedure.

                      The Colonoscope was introduced through the anus and 

                      advanced to the terminal ileum, with identification of 

                      the appendiceal orifice and IC valve. The colonoscopy 

                      was performed without difficulty. The patient tolerated 

                      the procedure well. The quality of the bowel preparation 

                      was good. The terminal ileum, ileocecal valve, 

                      appendiceal orifice, and rectum were photographed. Scope 

                      insertion time was 2 minutes. Scope withdrawal time was 

                      8 minutes. The total duration of the procedure was 10 

                      minutes.

                                                                                

Findings:

     The perianal and digital rectal examinations were normal.

     The terminal ileum appeared normal.

     A 6 mm polyp was found in the recto-sigmoid colon. The polyp was sessile. 

     The polyp was removed with a cold snare. Resection and retrieval were 

     complete. Verification of patient identification for the specimen was 

     done by the physician and nurse using the patient's name, birth date and 

     medical record number. Estimated blood loss was minimal.

     Non-bleeding external and internal hemorrhoids were found during 

     retroflexion. The hemorrhoids were medium-sized.

     No other significant abnormalities were identified in a careful 

     examination of the remainder of the colon.

                                                                                

Impression:           - The examined portion of the ileum was normal.

                      - One 6 mm polyp at the recto-sigmoid colon, removed 

                      with a cold snare. Resected and retrieved.

                      - Non-bleeding external and internal hemorrhoids.

Recommendation:       - Patient has a contact number available for 

                      emergencies. The signs and symptoms of potential delayed 

                      complications were discussed with the patient. Return to 

                      normal activities tomorrow. Written discharge 

                      instructions were provided to the patient.

                      - High fiber diet.

                      - Continue present medications.

                      - Use fiber, for example Citrucel, Fibercon, Konsyl or 

                      Metamucil.

                      - Repeat colonoscopy in 5-10 years for surveillance 

                      based on pathology results.

                      - Telephone GI clinic for pathology results in 2 weeks.

                      - Return to GI clinic if persistent symptoms or new 

                      symptoms.

                      - Return to primary care physician.

                                                                                

Procedure Code(s):    --- Professional ---

                      51919, Colonoscopy, flexible; with removal of tumor(s), 

                      polyp(s), or other lesion(s) by snare technique

Diagnosis Code(s):    --- Professional ---

                      K64.8, Other hemorrhoids

                      K63.5, Polyp of colon

                      K92.1, Melena (includes Hematochezia)

                      K62.5, Hemorrhage of anus and rectum

 

CPT copyright 2019 [employer]. All rights reserved.

 

The codes documented in this report are preliminary and upon  review may 

be revised to meet current compliance requirements.

 

Mychal Márquez MD

_______________________

Mychal Márquez MD

5/10/2021 5:56:50 PM

Electronically signed by Mychal Márquez MD

Number of Addenda: 0

 

Note Initiated On: 5/10/2021 4:59 PM

Estimated Blood Loss: Estimated blood loss was minimal.

## 2021-05-10 NOTE — CR.PDOC
General


Date of Consultation:  May 9, 2021


Referring Provider:  CHEPE RINCON MD


Attending Physician:  ARSLAN CASTILLO MD





Consultation


REASON FOR CONSULTATION/CHIEF COMPLAINT: . 





HISTORY OF PRESENT ILLNESS: . 





ALLERGIES: Please see below.





HOME MEDICATIONS: Please see below.





PAST MEDICAL HISTORY:


1. .


2. . 





PAST SURGICAL HISTORY: 


1. 


2. 





FAMILY HISTORY:


Father: 


Mother: 


Siblings: 


Children: 


Hereditary Diseases: 


Unexpected deaths due to medical reasons:  





SOCIAL HISTORY:


Marital status and/or living arrangements: 


Children: 


Employment: 


Tobacco use: 


ETOH: 


Illicit drug use: 


IV drug use: 


Other relevant social factors: 





REVIEW OF SYSTEMS:


CONSTITUTIONAL: .


HEENT: .


CARDIOVASCULAR: .


RESPIRATORY: .


GENITOURINARY: .


MUSCULOSKELETAL: .


GASTROINTESTINAL: .


SKIN: .


NEUROLOGICAL: .


PSYCHIATRIC: .


ENDOCRINE: .


HEMATOLOGIC/LYMPHATIC: .


ALLERGIC/IMMUNOLOGIC: .





PHYSICAL EXAMINATION:


VITAL SIGNS: Please see below.


GENERAL APPEARANCE: .


HEENT: .


RESPIRATORY: .


CARDIOVASCULAR: .


ABDOMEN: .


EXTREMITIES: .


NEUROLOGICAL: .  


PSYCHIATRIC: .





LABORATORY DATA: Please see below.





ASSESSMENT/PLAN: 


1. .


2. .





Vital Signs/I&O





Vital Signs








  Date Time  Temp Pulse Resp B/P (MAP) Pulse Ox O2 Delivery O2 Flow Rate FiO2


 


5/10/21 11:25 97.1 72 18 129/85 (100) 96 Room Air  














I&O- Last 24 Hours up to 6 AM 


 


 5/10/21





 06:00


 


Intake Total 1355 ml


 


Output Total 2275 ml


 


Balance -920 ml











Laboratory Data


Labs 24H


Laboratory Tests 2


5/10/21 05:43: 


Immature Granulocyte % (Auto) 0.7, Neutrophils (%) (Auto) 60.4, Lymphocytes (%) 

(Auto) 22.5L, Monocytes (%) (Auto) 14.3H, Eosinophils (%) (Auto) 1.7, Basophils 

(%) (Auto) 0.4, Neutrophils # (Auto) 4.9, Lymphocytes # (Auto) 1.8, Monocytes # 

(Auto) 1.2H, Eosinophils # (Auto) 0.1, Basophils # (Auto) 0.0, Nucleated Red 

Blood Cells % (auto) 0.0, Anion Gap 6L, Glomerular Filtration Rate > 60.0, 

Calcium Level 8.5, Magnesium Level 2.1


CBC/BMP


Laboratory Tests


5/10/21 05:43








Microbiology





Microbiology


5/6/21 Blood Culture - Preliminary, Resulted


         No Growth after 72 hours. All specime...


5/6/21 Stool Occult Blood (FERNANDO) - Final, Complete


         


5/6/21 Blood Culture - Preliminary, Resulted


         No Growth after 72 hours. All specime...


5/6/21 Respiratory Virus Panel (PCR) (FERNANDO) - Final, Complete





Allergies


Coded Allergies:  


     isopropyl alcohol (Verified  Allergy, Unknown, 5/1/21)





Home Medications


Scheduled


Cyclobenzaprine HCl (Cyclobenzaprine HCl) 10 Mg Tablet, 10 MG PO BID for 6 Days,

#12


Docusate Sodium (Dok) 100 Mg Capsule, 100 MG PO BID for 30 Days, #60


Omeprazole (Omeprazole) 40 Mg Capsule.dr, 1 CAP PO DAILY for 30 Days, #30





Scheduled PRN


Ibuprofen (Ibuprofen) 400 Mg Tablet, 400 MG PO Q6HP PRN for PAIN for 10 Days, 

#40


Ondansetron (Ondansetron Odt) 4 Mg Tab.rapdis, 4 MG PO Q6H PRN for NAUSEA OR 

VOMITING, (Reported)











ARSLAN CASTILLO MD      May 10, 2021 17:21

## 2021-05-10 NOTE — DS.PDOC
Discharge Summary


General


Date of Admission


May 6, 2021 at 10:59


Date of Discharge


5/10/2021





Discharge Summary


PROCEDURES PERFORMED DURING STAY: 


Colonoscopy revealing internal and external hemorrhoids on 5/10/2021 with Dr. Márquez 





ADMITTING DIAGNOSES / DISCHARGE DIAGNOSES:


Abdominal / back pain - likely 2/2 musculoskeletal pain 


Reported dark stools


s/p Leukocytosis 


Reported CVA (2017) 


Right adrenal mass 


Testicular cancer 


Anxiety


DVT prophylaxis 





COMPLICATIONS/CHIEF COMPLAINT: 


Abdominal Pain / Back Pain





HISTORY OF PRESENT ILLNESS: 


Patient is a 30-year-old  male who presented to the emergency room with

complaints of persistent back and abdominal pain. Patient has been experiencing 

back/abdominal pain since last week Wednesday and has progressed to nausea and 

vomiting. Upon arrival to emergency room, patient was found to have an elevated 

lipase and was admitted to the hospitalist service for suspected acute 

pancreatitis.





HOSPITAL COURSE:


Abdominal / back pain - likely 2/2 musculoskeletal pain 


- Patient is reporting progressive back/abdominal pain that has been progressing

over the duration of 1 week


- Currently patient's pain is well controlled / tolerable; patient has been able

to get out of bed and ambulate


- Denies any loss of control of bowel or bladder


- Lipase was noted to be elevated >3x upper limit of normal; however - likely 

2/2 nausea and vomiting 


- Triglyceride / ethanol level are wnl; Urine drug screen positive for Opiates /

Cannabinoids 


- Imaging noted above 


- s/p IV fluids


- c/w NSAIDS and muscle relaxants 


- Patient has been advised to follow-up with primary care provider within the 

next 7 days





Reported dark stools


- Patient has completed a bowel prep without any further evidence of bleeding


- Hg has remained stable through this entire hospitalization / has not required 

any transfusions


- Occult blood negative


- Will DC Protonix / Carafate; c/w Omeprazole on discharge 


- s/p Colonoscopy on 5/10/2021 - non bleeding internal and external hemorrhoids 


- Gastroenterology on consultation


- Patient has been advised to follow-up with primary care provider, and gastric 

nephrology within the next 7 days





s/p Leukocytosis 


- Review of systems is negative for any source of infection


- Blood cultures 5/6: Pending


- Pro-calcitonin negative 


- Will hold off on antibiotics at this time 





Reported CVA (2017) 


- Currently not on ASA 





Right adrenal mass 


- Imaging reports stable in size dating back to 01/12/2018, likely a lipid poor 

adenoma


- Patient was already aware of imaging findings


- Will have outpatient follow up with PCP 





Testicular cancer 


- Seminoma Diagnosed in 2017


- s/p Left orchiectomy 2017 with 2 sessions of radiation





Anxiety


- Currently not on any medications





DVT prophylaxis 


- c/w TEDs/[employer]





DISCHARGE MEDICATIONS: 


Please see below.


 


ALLERGIES: 


Please see below.





PHYSICAL EXAMINATION ON DISCHARGE:


Vitals (See below)


General: Patient is laying in bed, appears to be comfortable without any acute 

distress, remains oriented to person, place and time


HEENT: Is normocephalic and atraumatic


CVS: +S1S2


Lungs: There appears to be fair air entry bilaterally without any evidence of 

wheezing, crackles or rhonchi on auscultation


Abdomen: Abdomen again is soft, nondistended, some tenderness is appreciated at 

bilateral lower quadrants


Extremities: Again, there is no edema of his lower extremities





LABORATORY DATA: 


Please see below.





IMAGING: 


CT abdomen / pelvis 5/6:


No acute abdominopelvic pathology appreciated. Benign right adrenal adenoma 

remains stable compared with 2018.





CTA chest 5/6:


Normal thoracic aorta without aneurysm or dissection. No evidence for pulmonary 

embolus.





CXR 5/6:


Elevated left hemidiaphragm. Cephalization. No infiltrate seen.





Liver US 5/6:


Small right adrenal nodule noted as on CT.  Evidence of fatty infiltration of 

the liver.





CTA abdomen / pelvis 5/8:


1. Small amount of radiodense material in the stomach, particularly at the 

gastric fundus, most likely recently ingested radiodense material such as 

bismuth.  Recommend correlation with recent ingestions, as intraluminal gastric 

hemorrhage could alternatively cause this appearance.  Also suggest correlation 

with patient history of melena or rectal bleeding.  Fecal occult blood testing 

could be considered at clinical discretion.  Smaller foci of increased density 

in mid small bowel and transverse colon are likely small volume radiodense 

ingested material, which has clearly moved in the colon since 05/06/2021.


2. Multiple small lymph nodes in the abdomen and pelvis, 1 of which is mildly 

enlarged in the portacaval region, similar to recent studies but slightly 

increased in size since 01/12/2018.  Small nonenlarged nodes in the 

peripancreatic and periportal region are similar to recent studies but slightly 

larger than on 01/12/2018.


3. Diffuse fatty liver change.


4. Right adrenal mass which is stable in size dating back to 01/12/2018, likely 

a lipid poor adenoma.


5. Small noninflamed fat containing umbilical hernia likely present.


6. No atherosclerosis or arterial occlusion noted.  





MRI LS Spine 5/8:


Unremarkable spine. Please see details above.





ACTIVITY: 


[As tolerated].





DISCHARGE PLAN:


Follow up with PCP and GI within 7 days


Remain compliant with treatment plan and medications


Return to the ER if you experience any problems 





DISPOSITION:


Home





DISCHARGE CONDITION: 


[Stable].





TIME SPENT ON DISCHARGE: 


35 minutes.





Vital Signs/I&Os





Vital Signs








  Date Time  Temp Pulse Resp B/P (MAP) Pulse Ox O2 Delivery O2 Flow Rate FiO2


 


5/10/21 11:25 97.1 72 18 129/85 (100) 96 Room Air  














I&O- Last 24 Hours up to 6 AM 


 


 5/10/21





 06:00


 


Intake Total 1355 ml


 


Output Total 2275 ml


 


Balance -920 ml











Laboratory Data


Labs 24H


Laboratory Tests 2


5/10/21 05:43: 


Immature Granulocyte % (Auto) 0.7, Neutrophils (%) (Auto) 60.4, Lymphocytes (%) 

(Auto) 22.5L, Monocytes (%) (Auto) 14.3H, Eosinophils (%) (Auto) 1.7, Basophils 

(%) (Auto) 0.4, Neutrophils # (Auto) 4.9, Lymphocytes # (Auto) 1.8, Monocytes # 

(Auto) 1.2H, Eosinophils # (Auto) 0.1, Basophils # (Auto) 0.0, Nucleated Red 

Blood Cells % (auto) 0.0, Anion Gap 6L, Glomerular Filtration Rate > 60.0, 

Calcium Level 8.5, Magnesium Level 2.1


CBC/BMP


Laboratory Tests


5/10/21 05:43











Microbiology





Microbiology


5/6/21 Blood Culture - Preliminary, Resulted


         No Growth after 72 hours. All specime...


5/6/21 Stool Occult Blood (FERNANDO) - Final, Complete


         


5/6/21 Blood Culture - Preliminary, Resulted


         No Growth after 72 hours. All specime...


5/6/21 Respiratory Virus Panel (PCR) (FERNANDO) - Final, Complete





Discharge Medications


Scheduled


Cyclobenzaprine HCl (Cyclobenzaprine HCl) 10 Mg Tablet, 10 MG PO BID


Docusate Sodium (Dok) 100 Mg Capsule, 100 MG PO BID


Omeprazole (Omeprazole) 40 Mg Capsule.dr, 1 CAP PO DAILY





Scheduled PRN


Ibuprofen (Ibuprofen) 400 Mg Tablet, 400 MG PO Q6HP PRN for PAIN





Allergies


Coded Allergies:  


     isopropyl alcohol (Verified  Allergy, Unknown, 5/1/21)











CHEPE RINCON MD                May 10, 2021 16:10